# Patient Record
Sex: FEMALE | Race: WHITE | NOT HISPANIC OR LATINO | Employment: OTHER | URBAN - METROPOLITAN AREA
[De-identification: names, ages, dates, MRNs, and addresses within clinical notes are randomized per-mention and may not be internally consistent; named-entity substitution may affect disease eponyms.]

---

## 2024-02-07 PROBLEM — M15.0 PRIMARY OSTEOARTHRITIS INVOLVING MULTIPLE JOINTS: Status: ACTIVE | Noted: 2024-02-07

## 2024-05-29 ENCOUNTER — OUTSIDE FACILITY SERVICE (OUTPATIENT)
Dept: GASTROENTEROLOGY | Facility: CLINIC | Age: 64
End: 2024-05-29
Payer: MEDICAID

## 2024-05-29 PROCEDURE — 45385 COLONOSCOPY W/LESION REMOVAL: CPT | Performed by: INTERNAL MEDICINE

## 2024-05-29 PROCEDURE — 88305 TISSUE EXAM BY PATHOLOGIST: CPT | Performed by: INTERNAL MEDICINE

## 2024-05-29 PROCEDURE — 45380 COLONOSCOPY AND BIOPSY: CPT | Performed by: INTERNAL MEDICINE

## 2024-05-29 PROCEDURE — 43239 EGD BIOPSY SINGLE/MULTIPLE: CPT | Performed by: INTERNAL MEDICINE

## 2024-05-30 ENCOUNTER — LAB REQUISITION (OUTPATIENT)
Dept: LAB | Facility: HOSPITAL | Age: 64
End: 2024-05-30
Payer: MEDICAID

## 2024-05-30 DIAGNOSIS — K57.30 DIVERTICULOSIS OF LARGE INTESTINE WITHOUT PERFORATION OR ABSCESS WITHOUT BLEEDING: ICD-10-CM

## 2024-05-30 DIAGNOSIS — K31.89 OTHER DISEASES OF STOMACH AND DUODENUM: ICD-10-CM

## 2024-05-30 DIAGNOSIS — D12.3 BENIGN NEOPLASM OF TRANSVERSE COLON: ICD-10-CM

## 2024-05-30 DIAGNOSIS — R10.11 RIGHT UPPER QUADRANT PAIN: ICD-10-CM

## 2024-05-30 DIAGNOSIS — R19.7 DIARRHEA, UNSPECIFIED: ICD-10-CM

## 2024-05-30 DIAGNOSIS — K21.00 GASTRO-ESOPHAGEAL REFLUX DISEASE WITH ESOPHAGITIS, WITHOUT BLEEDING: ICD-10-CM

## 2024-05-30 DIAGNOSIS — K44.9 DIAPHRAGMATIC HERNIA WITHOUT OBSTRUCTION OR GANGRENE: ICD-10-CM

## 2024-05-30 DIAGNOSIS — K64.8 OTHER HEMORRHOIDS: ICD-10-CM

## 2024-06-03 ENCOUNTER — TELEPHONE (OUTPATIENT)
Dept: GASTROENTEROLOGY | Facility: CLINIC | Age: 64
End: 2024-06-03
Payer: MEDICAID

## 2024-06-03 LAB — REF LAB TEST METHOD: NORMAL

## 2024-06-04 ENCOUNTER — TELEPHONE (OUTPATIENT)
Dept: GASTROENTEROLOGY | Facility: CLINIC | Age: 64
End: 2024-06-04
Payer: MEDICAID

## 2024-06-04 NOTE — TELEPHONE ENCOUNTER
----- Message from Tarik Moore sent at 5/31/2024 11:55 AM EDT -----  There was no evidence for celiac sprue on the small bowel biopsies.  The ileal biopsy was also normal.  The pathologist commented on focally mild active colitis but there is no chronicity.  The findings were not consistent with IBS.  This is most consistent with a postinfectious change.

## 2024-06-05 ENCOUNTER — OFFICE VISIT (OUTPATIENT)
Dept: FAMILY MEDICINE CLINIC | Facility: CLINIC | Age: 64
End: 2024-06-05
Payer: MEDICAID

## 2024-06-05 VITALS
BODY MASS INDEX: 31.5 KG/M2 | DIASTOLIC BLOOD PRESSURE: 80 MMHG | RESPIRATION RATE: 15 BRPM | WEIGHT: 196 LBS | HEIGHT: 66 IN | SYSTOLIC BLOOD PRESSURE: 124 MMHG

## 2024-06-05 DIAGNOSIS — E03.9 HYPOTHYROIDISM, UNSPECIFIED TYPE: ICD-10-CM

## 2024-06-05 DIAGNOSIS — E06.3 HYPOTHYROIDISM DUE TO HASHIMOTO'S THYROIDITIS: Primary | ICD-10-CM

## 2024-06-05 DIAGNOSIS — K80.20 CALCULUS OF GALLBLADDER WITHOUT CHOLECYSTITIS WITHOUT OBSTRUCTION: ICD-10-CM

## 2024-06-05 DIAGNOSIS — E78.00 HYPERCHOLESTEROLEMIA: ICD-10-CM

## 2024-06-05 DIAGNOSIS — E03.8 HYPOTHYROIDISM DUE TO HASHIMOTO'S THYROIDITIS: Primary | ICD-10-CM

## 2024-06-05 DIAGNOSIS — F45.8 BRUXISM (TEETH GRINDING): ICD-10-CM

## 2024-06-05 DIAGNOSIS — I10 PRIMARY HYPERTENSION: ICD-10-CM

## 2024-06-05 DIAGNOSIS — F33.42 RECURRENT MAJOR DEPRESSIVE DISORDER, IN FULL REMISSION: ICD-10-CM

## 2024-06-05 PROCEDURE — 3079F DIAST BP 80-89 MM HG: CPT | Performed by: PHYSICIAN ASSISTANT

## 2024-06-05 PROCEDURE — 1125F AMNT PAIN NOTED PAIN PRSNT: CPT | Performed by: PHYSICIAN ASSISTANT

## 2024-06-05 PROCEDURE — 3074F SYST BP LT 130 MM HG: CPT | Performed by: PHYSICIAN ASSISTANT

## 2024-06-05 PROCEDURE — 36415 COLL VENOUS BLD VENIPUNCTURE: CPT | Performed by: PHYSICIAN ASSISTANT

## 2024-06-05 PROCEDURE — 99214 OFFICE O/P EST MOD 30 MIN: CPT | Performed by: PHYSICIAN ASSISTANT

## 2024-06-05 RX ORDER — ATORVASTATIN CALCIUM 40 MG/1
40 TABLET, FILM COATED ORAL DAILY
Qty: 90 TABLET | Refills: 1 | Status: SHIPPED | OUTPATIENT
Start: 2024-06-05

## 2024-06-05 RX ORDER — METHOCARBAMOL 500 MG/1
500 TABLET, FILM COATED ORAL 3 TIMES DAILY
Qty: 270 TABLET | Refills: 1 | Status: SHIPPED | OUTPATIENT
Start: 2024-06-05

## 2024-06-05 RX ORDER — METOPROLOL SUCCINATE 50 MG/1
50 TABLET, EXTENDED RELEASE ORAL DAILY
Qty: 90 TABLET | Refills: 1 | Status: SHIPPED | OUTPATIENT
Start: 2024-06-05

## 2024-06-05 RX ORDER — ESCITALOPRAM OXALATE 10 MG/1
10 TABLET ORAL DAILY
Qty: 90 TABLET | Refills: 1 | Status: SHIPPED | OUTPATIENT
Start: 2024-06-05

## 2024-06-05 NOTE — ASSESSMENT & PLAN NOTE
Patient does have a choice to make about proceeding with a cholecystectomy.  It could be that this will become emergent in the future if she were to develop gallstone pancreatitis but at this point it is optional.  Patient is still thinking about whether she wants to proceed.  She has already consulted with her surgeon.

## 2024-06-05 NOTE — PROGRESS NOTES
Patient Office Visit      Patient Name: Deloris Gary  : 1960   MRN: [id number]     Chief Complaint:    Chief Complaint   Patient presents with    Hypothyroidism       History of Present Illness: Deloris Gary is a 63 y.o. female who is here today for follow-up after starting thyroid medication.  She needs repeat labs today.  She is feeling a little more energetic and sleeping less since starting the thyroid medication.  She had an EGD and colonoscopy recently and was found to have some gastritis.  She says she has gone on a gluten-free and dairy free diet and is feeling much better but still having some right upper quadrant abdominal pain.  She had a CT scan showing a single gallstone and has already consulted general surgery about a cholecystectomy.  She knows at this time surgery is an option especially if she can live with the abdominal discomfort she is experiencing.  She needs refill on all of her medications.    Subjective      Review of Systems:         Past Medical History:   Past Medical History:   Diagnosis Date    Anxiety     Arthritis     Bruxism (teeth grinding) 2024    Elevated blood pressure reading     Endometrial hyperplasia 2024    Factor 5 Leiden mutation, heterozygous 01/10/2023    Fibromyalgia     Gallstones 2024    History of D&C 1980    Hypercholesterolemia 2022    Neurotic depression     Primary hypertension 2022    Primary osteoarthritis involving multiple joints 2024    Pulmonary nodules 2024    Recurrent major depressive disorder, in full remission 2024    Rheumatic fever     AS A CHILD       Past Surgical History:   Past Surgical History:   Procedure Laterality Date    KIDNEY SURGERY      TONSILLECTOMY         Family History:   Family History   Problem Relation Age of Onset    Cancer Mother         lung stage 4    Osteoarthritis Mother     Cancer Father     Breast cancer Maternal Aunt     Diabetes Other     Osteoarthritis Other   "      Social History:   Social History     Socioeconomic History    Marital status: Single   Tobacco Use    Smoking status: Former     Current packs/day: 0.00     Average packs/day: 1 pack/day for 25.0 years (25.0 ttl pk-yrs)     Types: Cigarettes     Start date:      Quit date:      Years since quittin.4     Passive exposure: Never    Smokeless tobacco: Never   Vaping Use    Vaping status: Never Used   Substance and Sexual Activity    Alcohol use: Not Currently    Drug use: Never    Sexual activity: Not Currently       Allergies:   No Known Allergies    Objective     Physical Exam:  Vital Signs:   Vitals:    24 1054   BP: 124/80   BP Location: Right arm   Patient Position: Sitting   Cuff Size: Adult   Resp: 15   Weight: 88.9 kg (196 lb)   Height: 167.6 cm (66\")     Body mass index is 31.64 kg/m².           Physical Exam  Constitutional:       General: She is not in acute distress.     Appearance: Normal appearance. She is obese.   Neurological:      Mental Status: She is alert.   Psychiatric:         Mood and Affect: Mood normal.         Behavior: Behavior normal.         Thought Content: Thought content normal.         Judgment: Judgment normal.         Procedures    Assessment / Plan      Assessment/Plan:   Diagnoses and all orders for this visit:    1. Hypothyroidism due to Hashimoto's thyroiditis (Primary)  Assessment & Plan:  Check a TSH, hold off on sending her thyroid medication and until the results come back.    Orders:  -     TSH  -     T4, Free    2. Hypercholesterolemia  -     atorvastatin (LIPITOR) 40 MG tablet; Take 1 tablet by mouth Daily.  Dispense: 90 tablet; Refill: 1    3. Recurrent major depressive disorder, in full remission  -     escitalopram (LEXAPRO) 10 MG tablet; Take 1 tablet by mouth Daily.  Dispense: 90 tablet; Refill: 1    4. Bruxism (teeth grinding)  -     methocarbamol (ROBAXIN) 500 MG tablet; Take 1 tablet by mouth 3 (Three) Times a Day.  Dispense: 270 tablet; " Refill: 1    5. Primary hypertension  Assessment & Plan:  Hypertension is stable and controlled  Continue current treatment regimen.  Blood pressure will be reassessed in 6 months.    Orders:  -     metoprolol succinate XL (TOPROL-XL) 50 MG 24 hr tablet; Take 1 tablet by mouth Daily.  Dispense: 90 tablet; Refill: 1    6. Calculus of gallbladder without cholecystitis without obstruction  Assessment & Plan:  Patient does have a choice to make about proceeding with a cholecystectomy.  It could be that this will become emergent in the future if she were to develop gallstone pancreatitis but at this point it is optional.  Patient is still thinking about whether she wants to proceed.  She has already consulted with her surgeon.             Medications:     Current Outpatient Medications:     albuterol sulfate  (90 Base) MCG/ACT inhaler, Inhale 2 puffs Every 4 (Four) Hours As Needed for Wheezing., Disp: 18 g, Rfl: 11    aspirin 325 MG tablet, Take 1 tablet by mouth Daily., Disp: , Rfl:     atorvastatin (LIPITOR) 40 MG tablet, Take 1 tablet by mouth Daily., Disp: 90 tablet, Rfl: 1    escitalopram (LEXAPRO) 10 MG tablet, Take 1 tablet by mouth Daily., Disp: 90 tablet, Rfl: 1    levothyroxine (SYNTHROID, LEVOTHROID) 50 MCG tablet, Take 1 tablet by mouth Daily. Cancel rx for 25 mcg dose., Disp: 90 tablet, Rfl: 0    methocarbamol (ROBAXIN) 500 MG tablet, Take 1 tablet by mouth 3 (Three) Times a Day., Disp: 270 tablet, Rfl: 1    metoprolol succinate XL (TOPROL-XL) 50 MG 24 hr tablet, Take 1 tablet by mouth Daily., Disp: 90 tablet, Rfl: 1    multivitamin with minerals tablet tablet, Take 1 tablet by mouth Daily., Disp: , Rfl:     Sodium Sulfate-Mag Sulfate-KCl (SUTAB) 3800-997-462 MG tablet, Take 24 tablets by mouth Take As Directed., Disp: 24 tablet, Rfl: 0        Follow Up:   Return in about 5 months (around 11/5/2024) for 30 minute med recheck.    Constance Goodson PA-C   [facility] [place] [facility]     NOTE TO  PATIENT: The 21st Century Cures Act makes medical notes like these available to patients in the interest of transparency. However, be advised this is a medical document. It is intended as peer to peer communication. It is written in medical language and may contain abbreviations or verbiage that are unfamiliar. It may appear blunt or direct. Medical documents are intended to carry relevant information, facts as evident, and the clinical opinion of the practitioner.

## 2024-06-06 LAB
T4 FREE SERPL-MCNC: 1.24 NG/DL (ref 0.82–1.77)
TSH SERPL DL<=0.005 MIU/L-ACNC: 1.92 UIU/ML (ref 0.45–4.5)

## 2024-06-06 RX ORDER — LEVOTHYROXINE SODIUM 0.05 MG/1
50 TABLET ORAL DAILY
Qty: 90 TABLET | Refills: 1 | Status: SHIPPED | OUTPATIENT
Start: 2024-06-06

## 2024-06-13 ENCOUNTER — HOSPITAL ENCOUNTER (OUTPATIENT)
Dept: CT IMAGING | Facility: HOSPITAL | Age: 64
Discharge: HOME | End: 2024-06-13
Admitting: INTERNAL MEDICINE
Payer: MEDICAID

## 2024-06-13 DIAGNOSIS — R91.8 PULMONARY NODULES: ICD-10-CM

## 2024-06-13 PROCEDURE — 71250 CT THORAX DX C-: CPT

## 2024-06-24 DIAGNOSIS — R91.8 PULMONARY NODULES: Primary | ICD-10-CM

## 2024-07-18 DIAGNOSIS — R92.8 ABNORMALITY OF BOTH BREASTS ON SCREENING MAMMOGRAM: ICD-10-CM

## 2024-07-23 DIAGNOSIS — R92.8 ABNORMALITY OF BOTH BREASTS ON SCREENING MAMMOGRAM: Primary | ICD-10-CM

## 2025-01-07 DIAGNOSIS — E03.9 HYPOTHYROIDISM, UNSPECIFIED TYPE: ICD-10-CM

## 2025-01-08 RX ORDER — LEVOTHYROXINE SODIUM 50 UG/1
50 TABLET ORAL DAILY
Qty: 90 TABLET | Refills: 0 | Status: SHIPPED | OUTPATIENT
Start: 2025-01-08

## 2025-02-07 DIAGNOSIS — R92.8 ABNORMALITY OF BOTH BREASTS ON SCREENING MAMMOGRAM: ICD-10-CM

## 2025-02-07 DIAGNOSIS — R92.8 ABNORMALITY OF BOTH BREASTS ON SCREENING MAMMOGRAM: Primary | ICD-10-CM

## 2025-02-07 NOTE — PROGRESS NOTES
Stable 6-month follow-up mammogram, repeat bilateral diagnostic mammogram in 6 months and a left breast ultrasound in 6 months.

## 2025-02-12 ENCOUNTER — OFFICE VISIT (OUTPATIENT)
Dept: FAMILY MEDICINE CLINIC | Facility: CLINIC | Age: 65
End: 2025-02-12
Payer: MEDICAID

## 2025-02-12 VITALS
OXYGEN SATURATION: 97 % | HEIGHT: 66 IN | BODY MASS INDEX: 31.66 KG/M2 | WEIGHT: 197 LBS | DIASTOLIC BLOOD PRESSURE: 80 MMHG | HEART RATE: 75 BPM | SYSTOLIC BLOOD PRESSURE: 118 MMHG

## 2025-02-12 DIAGNOSIS — E66.09 CLASS 1 OBESITY DUE TO EXCESS CALORIES WITH SERIOUS COMORBIDITY AND BODY MASS INDEX (BMI) OF 31.0 TO 31.9 IN ADULT: ICD-10-CM

## 2025-02-12 DIAGNOSIS — E66.811 CLASS 1 OBESITY DUE TO EXCESS CALORIES WITH SERIOUS COMORBIDITY AND BODY MASS INDEX (BMI) OF 31.0 TO 31.9 IN ADULT: ICD-10-CM

## 2025-02-12 DIAGNOSIS — Z13.1 SCREENING FOR DIABETES MELLITUS: ICD-10-CM

## 2025-02-12 DIAGNOSIS — E06.3 HYPOTHYROIDISM DUE TO HASHIMOTO'S THYROIDITIS: ICD-10-CM

## 2025-02-12 DIAGNOSIS — Z79.899 HIGH RISK MEDICATION USE: ICD-10-CM

## 2025-02-12 DIAGNOSIS — F33.42 RECURRENT MAJOR DEPRESSIVE DISORDER, IN FULL REMISSION: ICD-10-CM

## 2025-02-12 DIAGNOSIS — I10 PRIMARY HYPERTENSION: Primary | ICD-10-CM

## 2025-02-12 DIAGNOSIS — E78.00 HYPERCHOLESTEROLEMIA: ICD-10-CM

## 2025-02-12 DIAGNOSIS — F45.8 BRUXISM (TEETH GRINDING): ICD-10-CM

## 2025-02-12 DIAGNOSIS — R53.83 FATIGUE, UNSPECIFIED TYPE: ICD-10-CM

## 2025-02-12 PROBLEM — E03.9 HYPOTHYROIDISM: Status: RESOLVED | Noted: 2024-03-07 | Resolved: 2025-02-12

## 2025-02-12 PROCEDURE — 3079F DIAST BP 80-89 MM HG: CPT | Performed by: PHYSICIAN ASSISTANT

## 2025-02-12 PROCEDURE — 1125F AMNT PAIN NOTED PAIN PRSNT: CPT | Performed by: PHYSICIAN ASSISTANT

## 2025-02-12 PROCEDURE — 99214 OFFICE O/P EST MOD 30 MIN: CPT | Performed by: PHYSICIAN ASSISTANT

## 2025-02-12 PROCEDURE — 3074F SYST BP LT 130 MM HG: CPT | Performed by: PHYSICIAN ASSISTANT

## 2025-02-12 RX ORDER — METOPROLOL SUCCINATE 50 MG/1
50 TABLET, EXTENDED RELEASE ORAL DAILY
Qty: 90 TABLET | Refills: 1 | Status: SHIPPED | OUTPATIENT
Start: 2025-02-12

## 2025-02-12 RX ORDER — ATORVASTATIN CALCIUM 40 MG/1
40 TABLET, FILM COATED ORAL DAILY
Qty: 90 TABLET | Refills: 1 | Status: SHIPPED | OUTPATIENT
Start: 2025-02-12

## 2025-02-12 RX ORDER — METHOCARBAMOL 500 MG/1
500 TABLET, FILM COATED ORAL 3 TIMES DAILY
Qty: 270 TABLET | Refills: 1 | Status: SHIPPED | OUTPATIENT
Start: 2025-02-12

## 2025-02-12 RX ORDER — ESCITALOPRAM OXALATE 10 MG/1
10 TABLET ORAL DAILY
Qty: 90 TABLET | Refills: 1 | Status: SHIPPED | OUTPATIENT
Start: 2025-02-12

## 2025-02-12 RX ORDER — LEVOTHYROXINE SODIUM 50 UG/1
50 TABLET ORAL DAILY
Qty: 90 TABLET | Refills: 1 | Status: SHIPPED | OUTPATIENT
Start: 2025-02-12

## 2025-02-12 NOTE — PROGRESS NOTES
Patient Office Visit      Patient Name: Sarah Gary  : 1960   MRN: [id number]     Chief Complaint:    Chief Complaint   Patient presents with    Hypothyroidism     F/u    Anxiety    Hyperlipidemia    Hypertension    Depression       History of Present Illness: Sarah Gary is a 64 y.o. female who is here today for follow-up.  Continues to have fatigue and sleep disturbance.    Subjective      Review of Systems:   Review of Systems   Constitutional:  Positive for fatigue.   Respiratory:  Negative for shortness of breath.    Cardiovascular:  Negative for chest pain, palpitations and leg swelling.   Psychiatric/Behavioral:  Positive for sleep disturbance.         Past Medical History:   Past Medical History:   Diagnosis Date    Anxiety     Arthritis     Bruxism (teeth grinding) 2024    Elevated blood pressure reading     Endometrial hyperplasia 2024    Factor 5 Leiden mutation, heterozygous 01/10/2023    Fibromyalgia     Gallstones 2024    History of D&C 1980    Hypercholesterolemia 2022    Neurotic depression     Primary hypertension 2022    Primary osteoarthritis involving multiple joints 2024    Pulmonary nodules 2024    Recurrent major depressive disorder, in full remission 2024    Rheumatic fever     AS A CHILD       Past Surgical History:   Past Surgical History:   Procedure Laterality Date    KIDNEY SURGERY      TONSILLECTOMY         Family History:   Family History   Problem Relation Age of Onset    Cancer Mother         lung stage 4    Osteoarthritis Mother     Cancer Father     Breast cancer Maternal Aunt     Diabetes Other     Osteoarthritis Other        Social History:   Social History     Socioeconomic History    Marital status: Single   Tobacco Use    Smoking status: Former     Current packs/day: 0.00     Average packs/day: 1 pack/day for 25.0 years (25.0 ttl pk-yrs)     Types: Cigarettes     Start date:      Quit date:       "Years since quittin.1     Passive exposure: Past    Smokeless tobacco: Never   Vaping Use    Vaping status: Never Used   Substance and Sexual Activity    Alcohol use: Not Currently    Drug use: Never    Sexual activity: Not Currently       Allergies:   No Known Allergies    Objective     Physical Exam:  Vital Signs:   Vitals:    25 1253   BP: 118/80   BP Location: Left arm   Patient Position: Sitting   Cuff Size: Large Adult   Pulse: 75   SpO2: 97%   Weight: 89.4 kg (197 lb)   Height: 167.6 cm (66\")   PainSc:   1   PainLoc: Abdomen     Body mass index is 31.8 kg/m².   BMI is >= 30 and <35. (Class 1 Obesity). The following options were offered after discussion;: weight loss educational material (shared in after visit summary)       Physical Exam  Constitutional:       Appearance: Normal appearance.   Cardiovascular:      Rate and Rhythm: Normal rate and regular rhythm.   Pulmonary:      Effort: Pulmonary effort is normal.      Breath sounds: Normal breath sounds.   Musculoskeletal:      Cervical back: Normal range of motion and neck supple.   Neurological:      Mental Status: She is alert and oriented to person, place, and time.   Psychiatric:         Mood and Affect: Mood normal.         Behavior: Behavior normal.         Thought Content: Thought content normal.         Judgment: Judgment normal.         Procedures    Assessment / Plan      Assessment/Plan:   Diagnoses and all orders for this visit:    1. Primary hypertension (Primary)  Assessment & Plan:  Stable, continue current medication.    Orders:  -     metoprolol succinate XL (TOPROL-XL) 50 MG 24 hr tablet; Take 1 tablet by mouth Daily.  Dispense: 90 tablet; Refill: 1    2. Bruxism (teeth grinding)  Assessment & Plan:  Stable, continue current medication.    Orders:  -     methocarbamol (ROBAXIN) 500 MG tablet; Take 1 tablet by mouth 3 (Three) Times a Day.  Dispense: 270 tablet; Refill: 1    3. Recurrent major depressive disorder, in full " remission  Assessment & Plan:  Stable, continue current medication.    Orders:  -     escitalopram (LEXAPRO) 10 MG tablet; Take 1 tablet by mouth Daily.  Dispense: 90 tablet; Refill: 1    4. Hypercholesterolemia  Assessment & Plan:   Check lipid level and continue current medication.    Orders:  -     atorvastatin (LIPITOR) 40 MG tablet; Take 1 tablet by mouth Daily.  Dispense: 90 tablet; Refill: 1  -     Lipid Panel    5. Hypothyroidism due to Hashimoto's thyroiditis  Assessment & Plan:  Continue levothyroxine and check a TSH, free T4.    Orders:  -     levothyroxine (SYNTHROID, LEVOTHROID) 50 MCG tablet; Take 1 tablet by mouth Daily.  Dispense: 90 tablet; Refill: 1  -     TSH  -     T4, Free    6. High risk medication use  -     Comprehensive Metabolic Panel  -     CBC Auto Differential  -     CK    7. Screening for diabetes mellitus  -     Hemoglobin A1c    8. Fatigue, unspecified type  -     Comprehensive Metabolic Panel  -     Vitamin B12  -     Folate  -     TSH  -     T4, Free  -     CBC Auto Differential    9. Class 1 obesity due to excess calories with serious comorbidity and body mass index (BMI) of 31.0 to 31.9 in adult  Assessment & Plan:  Patient's (Body mass index is 31.8 kg/m².) indicates that they are obese (BMI >30) with health conditions that include hypertension and dyslipidemias . Weight is unchanged. BMI  is above average; BMI management plan is completed. We discussed low calorie, low carb based diet program, portion control, and increasing exercise.              Medications:     Current Outpatient Medications:     albuterol sulfate  (90 Base) MCG/ACT inhaler, Inhale 2 puffs Every 4 (Four) Hours As Needed for Wheezing., Disp: 18 g, Rfl: 11    aspirin 325 MG tablet, Take 1 tablet by mouth Daily., Disp: , Rfl:     atorvastatin (LIPITOR) 40 MG tablet, Take 1 tablet by mouth Daily., Disp: 90 tablet, Rfl: 1    escitalopram (LEXAPRO) 10 MG tablet, Take 1 tablet by mouth Daily., Disp: 90  tablet, Rfl: 1    levothyroxine (SYNTHROID, LEVOTHROID) 50 MCG tablet, Take 1 tablet by mouth Daily., Disp: 90 tablet, Rfl: 1    methocarbamol (ROBAXIN) 500 MG tablet, Take 1 tablet by mouth 3 (Three) Times a Day., Disp: 270 tablet, Rfl: 1    metoprolol succinate XL (TOPROL-XL) 50 MG 24 hr tablet, Take 1 tablet by mouth Daily., Disp: 90 tablet, Rfl: 1    multivitamin with minerals tablet tablet, Take 1 tablet by mouth Daily., Disp: , Rfl:     Sodium Sulfate-Mag Sulfate-KCl (SUTAB) 5398-469-736 MG tablet, Take 24 tablets by mouth Take As Directed. (Patient not taking: Reported on 2/12/2025), Disp: 24 tablet, Rfl: 0        Follow Up:   Return in about 6 months (around 8/12/2025) for Annual physical.    Constance Goodson PA-C   [facility] [place] [facility] [place] [facility]     NOTE TO PATIENT: The 21st Century Cures Act makes medical notes like these available to patients in the interest of transparency. However, be advised this is a medical document. It is intended as peer to peer communication. It is written in medical language and may contain abbreviations or verbiage that are unfamiliar. It may appear blunt or direct. Medical documents are intended to carry relevant information, facts as evident, and the clinical opinion of the practitioner.

## 2025-02-12 NOTE — ASSESSMENT & PLAN NOTE
Patient's (Body mass index is 31.8 kg/m².) indicates that they are obese (BMI >30) with health conditions that include hypertension and dyslipidemias . Weight is unchanged. BMI  is above average; BMI management plan is completed. We discussed low calorie, low carb based diet program, portion control, and increasing exercise.

## 2025-02-13 LAB
ALBUMIN SERPL-MCNC: 4.7 G/DL (ref 3.9–4.9)
ALP SERPL-CCNC: 69 IU/L (ref 44–121)
ALT SERPL-CCNC: 20 IU/L (ref 0–32)
AST SERPL-CCNC: 26 IU/L (ref 0–40)
BASOPHILS # BLD AUTO: 0.1 X10E3/UL (ref 0–0.2)
BASOPHILS NFR BLD AUTO: 1 %
BILIRUB SERPL-MCNC: 0.4 MG/DL (ref 0–1.2)
BUN SERPL-MCNC: 15 MG/DL (ref 8–27)
BUN/CREAT SERPL: 16 (ref 12–28)
CALCIUM SERPL-MCNC: 10.5 MG/DL (ref 8.7–10.3)
CHLORIDE SERPL-SCNC: 101 MMOL/L (ref 96–106)
CHOLEST SERPL-MCNC: 169 MG/DL (ref 100–199)
CK SERPL-CCNC: 49 U/L (ref 32–182)
CO2 SERPL-SCNC: 25 MMOL/L (ref 20–29)
CREAT SERPL-MCNC: 0.91 MG/DL (ref 0.57–1)
EGFRCR SERPLBLD CKD-EPI 2021: 71 ML/MIN/1.73
EOSINOPHIL # BLD AUTO: 0.1 X10E3/UL (ref 0–0.4)
EOSINOPHIL NFR BLD AUTO: 2 %
ERYTHROCYTE [DISTWIDTH] IN BLOOD BY AUTOMATED COUNT: 13.6 % (ref 11.7–15.4)
FOLATE SERPL-MCNC: 19.3 NG/ML
GLOBULIN SER CALC-MCNC: 2.5 G/DL (ref 1.5–4.5)
GLUCOSE SERPL-MCNC: 90 MG/DL (ref 70–99)
HBA1C MFR BLD: 5.9 % (ref 4.8–5.6)
HCT VFR BLD AUTO: 41.9 % (ref 34–46.6)
HDLC SERPL-MCNC: 63 MG/DL
HGB BLD-MCNC: 13.8 G/DL (ref 11.1–15.9)
IMM GRANULOCYTES # BLD AUTO: 0 X10E3/UL (ref 0–0.1)
IMM GRANULOCYTES NFR BLD AUTO: 0 %
LDLC SERPL CALC-MCNC: 81 MG/DL (ref 0–99)
LYMPHOCYTES # BLD AUTO: 2.6 X10E3/UL (ref 0.7–3.1)
LYMPHOCYTES NFR BLD AUTO: 32 %
MCH RBC QN AUTO: 28.9 PG (ref 26.6–33)
MCHC RBC AUTO-ENTMCNC: 32.9 G/DL (ref 31.5–35.7)
MCV RBC AUTO: 88 FL (ref 79–97)
MONOCYTES # BLD AUTO: 0.6 X10E3/UL (ref 0.1–0.9)
MONOCYTES NFR BLD AUTO: 8 %
NEUTROPHILS # BLD AUTO: 4.6 X10E3/UL (ref 1.4–7)
NEUTROPHILS NFR BLD AUTO: 57 %
PLATELET # BLD AUTO: 245 X10E3/UL (ref 150–450)
POTASSIUM SERPL-SCNC: 4.6 MMOL/L (ref 3.5–5.2)
PROT SERPL-MCNC: 7.2 G/DL (ref 6–8.5)
RBC # BLD AUTO: 4.77 X10E6/UL (ref 3.77–5.28)
SODIUM SERPL-SCNC: 140 MMOL/L (ref 134–144)
T4 FREE SERPL-MCNC: 1.19 NG/DL (ref 0.82–1.77)
TRIGL SERPL-MCNC: 147 MG/DL (ref 0–149)
TSH SERPL DL<=0.005 MIU/L-ACNC: 2.46 UIU/ML (ref 0.45–4.5)
VIT B12 SERPL-MCNC: 1685 PG/ML (ref 232–1245)
VLDLC SERPL CALC-MCNC: 25 MG/DL (ref 5–40)
WBC # BLD AUTO: 8 X10E3/UL (ref 3.4–10.8)

## 2025-06-24 DIAGNOSIS — R91.1 LUNG NODULE: Primary | ICD-10-CM

## 2025-07-29 ENCOUNTER — OFFICE VISIT (OUTPATIENT)
Dept: PULMONOLOGY | Facility: CLINIC | Age: 65
End: 2025-07-29
Payer: MEDICAID

## 2025-07-29 VITALS
BODY MASS INDEX: 32.47 KG/M2 | HEART RATE: 76 BPM | OXYGEN SATURATION: 97 % | DIASTOLIC BLOOD PRESSURE: 76 MMHG | SYSTOLIC BLOOD PRESSURE: 128 MMHG | WEIGHT: 202 LBS | TEMPERATURE: 96.1 F | HEIGHT: 66 IN

## 2025-07-29 DIAGNOSIS — Z87.891 PERSONAL HISTORY OF TOBACCO USE: ICD-10-CM

## 2025-07-29 DIAGNOSIS — R91.8 PULMONARY NODULES: Primary | ICD-10-CM

## 2025-07-29 PROCEDURE — 3074F SYST BP LT 130 MM HG: CPT | Performed by: NURSE PRACTITIONER

## 2025-07-29 PROCEDURE — 1159F MED LIST DOCD IN RCRD: CPT | Performed by: NURSE PRACTITIONER

## 2025-07-29 PROCEDURE — 99214 OFFICE O/P EST MOD 30 MIN: CPT | Performed by: NURSE PRACTITIONER

## 2025-07-29 PROCEDURE — 1160F RVW MEDS BY RX/DR IN RCRD: CPT | Performed by: NURSE PRACTITIONER

## 2025-07-29 PROCEDURE — 3078F DIAST BP <80 MM HG: CPT | Performed by: NURSE PRACTITIONER

## 2025-07-29 NOTE — PROGRESS NOTES
[facility]    CHIEF COMPLAINT    Shortness of breath and pulmonary nodules    HISTORY OF PRESENT ILLNESS    HPI:   Sarah Gary is a 65 y.o.female who establish care with our clinic in May 2024 evaluated by Dr. Moscoso regarding some shortness of breath and pulmonary nodules.    She is a former smoker with a 25-pack-year history who quit in 2023.  PFTs did show mild restriction without obstruction and normal DLCO felt to represent underlying asthma.  She was given a prescription of albuterol to use as needed, however did not require this.    CT from October 2023 showed pulmonary nodules largest measuring 11 mm in the right lower lobe which has been stable on subsequent imaging.    Interval history:   Patient is seen today for follow-up and has not been seen since her initial pulmonary evaluation.    Overall is doing well from a respiratory standpoint and has not had to use her albuterol rescue inhaler.  Overall feels her shortness of breath was mostly due to anxiety as she was dealing with multiple health issues simultaneously.    She is here to discuss her CT of the chest.    Denies recent ED visits, hospitalizations, or exacerbations.     Denies fever, chills, night sweats, or hemoptysis. No recent sick contacts. No chest pain or palpitations. Denies lower extremity swelling or calf tenderness.     Patient Active Problem List   Diagnosis    Primary hypertension    Hypercholesterolemia    Personal history of tobacco use    Factor 5 Leiden mutation, heterozygous    Diarrhea    Endometrial hyperplasia    Multiple pulmonary nodules largest right lower lobe 11 mm well-circumscribed (10/2023)    Calculus of gallbladder without cholecystitis without obstruction    Recurrent major depressive disorder, in full remission    Primary osteoarthritis involving multiple joints    Bruxism (teeth grinding)    Endometrial thickening on ultrasound    Hypercalcemia    General medical exam    Hypothyroidism due  to Hashimoto's thyroiditis    Class 1 obesity due to excess calories with serious comorbidity and body mass index (BMI) of 31.0 to 31.9 in adult       No Known Allergies    Current Outpatient Medications:     albuterol sulfate  (90 Base) MCG/ACT inhaler, Inhale 2 puffs Every 4 (Four) Hours As Needed for Wheezing., Disp: 18 g, Rfl: 11    aspirin 325 MG tablet, Take 1 tablet by mouth Daily., Disp: , Rfl:     atorvastatin (LIPITOR) 40 MG tablet, Take 1 tablet by mouth Daily., Disp: 90 tablet, Rfl: 1    escitalopram (LEXAPRO) 10 MG tablet, Take 1 tablet by mouth Daily., Disp: 90 tablet, Rfl: 1    levothyroxine (SYNTHROID, LEVOTHROID) 50 MCG tablet, Take 1 tablet by mouth Daily., Disp: 90 tablet, Rfl: 1    methocarbamol (ROBAXIN) 500 MG tablet, Take 1 tablet by mouth 3 (Three) Times a Day., Disp: 270 tablet, Rfl: 1    metoprolol succinate XL (TOPROL-XL) 50 MG 24 hr tablet, Take 1 tablet by mouth Daily., Disp: 90 tablet, Rfl: 1    multivitamin with minerals tablet tablet, Take 1 tablet by mouth Daily., Disp: , Rfl:   MEDICATION LIST AND ALLERGIES REVIEWED.    Social History     Tobacco Use    Smoking status: Former     Current packs/day: 0.00     Average packs/day: 1 pack/day for 25.0 years (25.0 ttl pk-yrs)     Types: Cigarettes     Start date:      Quit date:      Years since quittin.5     Passive exposure: Past    Smokeless tobacco: Never   Vaping Use    Vaping status: Never Used   Substance Use Topics    Alcohol use: Not Currently    Drug use: Never       FAMILY AND SOCIAL HISTORY REVIEWED.    Review of Systems   Constitutional:  Negative for chills, fatigue and fever.   Respiratory:  Negative for cough, chest tightness, shortness of breath and wheezing.    Cardiovascular:  Negative for chest pain, palpitations and leg swelling.   Skin:  Negative for color change.   Psychiatric/Behavioral:  Negative for sleep disturbance.    All other systems reviewed and are negative.      /76   Pulse 76    "Temp 96.1 °F (35.6 °C) (Temporal)   Ht 167.6 cm (66\")   Wt 91.6 kg (202 lb)   LMP  (LMP Unknown)   SpO2 97% Comment: Room air at rest  BMI 32.60 kg/m²     Immunization History   Administered Date(s) Administered    COVID-19 (PFIZER) Purple Cap Monovalent 07/16/2021, 08/06/2021       Physical Exam  Vitals reviewed.   Constitutional:       General: She is not in acute distress.     Appearance: Normal appearance.   Cardiovascular:      Rate and Rhythm: Normal rate and regular rhythm.      Pulses: Normal pulses.      Heart sounds: Normal heart sounds.   Pulmonary:      Effort: Pulmonary effort is normal. No respiratory distress.      Breath sounds: No wheezing, rhonchi or rales.   Skin:     General: Skin is warm and dry.   Neurological:      General: No focal deficit present.      Mental Status: She is alert and oriented to person, place, and time.         RESULTS    PFTs:  5/2024: No airway obstruction.  Mild restriction with TLC 4.19, 77% predicted.  Normal DLCO.    Imaging:   CT Chest Low Dose Follow Up Without Contrast  Result Date: 7/18/2025  Impression: Multiple stable bilateral pulmonary nodules. The largest nodule previously measured 10 mm and now measures 7 mm. Recommendation: Continue annual screening with LDCT as long as patient continues to qualify. Order CT Chest Low Dose Cancer Screening (CPT 67402). Lung Rads Assessment: Lung-RADS L2 - Benign appearance or <1% chance of malignancy. Electronically Signed: Kamille Escobedo MD  7/18/2025 3:19 PM EDT  Workstation ID: [id number]    LDCT of the chest 6/13/2024  Impression:  Stable bilateral pulmonary nodules measuring up to 1 cm, benign. No new suspicious pulmonary nodule or mass.  Borderline pulmonary artery enlargement which can be seen with pulmonary hypertension.  Cholelithiasis    Cardiac:   Results for orders placed in visit on 11/29/21    Echocardiogram Scan        PROBLEM LIST    Problem List Items Addressed This Visit       Personal history of tobacco " use    Relevant Orders     CT Chest Low Dose Cancer Screening WO    Multiple pulmonary nodules largest right lower lobe 11 mm well-circumscribed (10/2023) - Primary    Relevant Orders     CT Chest Low Dose Cancer Screening WO       DISCUSSION    Ms. Gary was seen today in follow-up and is done very well from a respiratory standpoint.    Prior PFTs did show some mild restriction felt to represent underlying asthma and she was given an albuterol rescue inhaler, however has not needed it since her initial pulmonary evaluation.  Will repeat PFTs at next visit.  Historically labs have been unrevealing for any significant peripheral eosinophilia.    October 2023 CT of the chest did show multiple pulmonary nodules largest measuring 11 mm in the right lower lobe which is now measured at 7 mm on the July 2025 study.  Based on lung cancer screening guidelines she will qualify for low-dose cancer screenings through 2038 due to July 2026 (order placed).    Given her prior restriction, her CT imaging has been unrevealing for ILD component.    Recommend remaining up-to-date on vaccinations.    Also recommend 30 minutes of low impact exercise at least 5 days a week.    Return to clinic in 1 year with PFTs or may see me sooner if needed.    I personally spent a total of 31 minutes on patient visit today including chart review, face to face with the patient obtaining the history and physical exam, review of pertinent images and tests, counseling and discussion and/or coordination of care as described above, and documentation.  Total time excludes time spent on other separate services such as performing procedures or test interpretation, if applicable.    Electronically signed by JUN Connelly, 07/29/25, 2:46 PM EDT.    Please note that portions of this note were completed with a voice recognition program.      CC: Constance Goodson PA

## 2025-08-11 ENCOUNTER — TELEPHONE (OUTPATIENT)
Dept: ADMINISTRATIVE | Facility: OTHER | Age: 65
End: 2025-08-11
Payer: MEDICAID

## 2025-08-21 ENCOUNTER — RESULTS FOLLOW-UP (OUTPATIENT)
Dept: FAMILY MEDICINE CLINIC | Facility: CLINIC | Age: 65
End: 2025-08-21
Payer: MEDICAID

## 2025-08-21 DIAGNOSIS — R92.8 ABNORMALITY OF BOTH BREASTS ON SCREENING MAMMOGRAM: Primary | ICD-10-CM

## 2025-08-21 DIAGNOSIS — Z12.31 SCREENING MAMMOGRAM FOR BREAST CANCER: ICD-10-CM
